# Patient Record
Sex: MALE | URBAN - METROPOLITAN AREA
[De-identification: names, ages, dates, MRNs, and addresses within clinical notes are randomized per-mention and may not be internally consistent; named-entity substitution may affect disease eponyms.]

---

## 2022-08-15 ENCOUNTER — OFFICE VISIT (OUTPATIENT)
Dept: URBAN - METROPOLITAN AREA CLINIC 76 | Facility: CLINIC | Age: 42
End: 2022-08-15
Payer: COMMERCIAL

## 2022-08-15 DIAGNOSIS — H52.13 MYOPIA, BILATERAL: Primary | ICD-10-CM

## 2022-08-15 DIAGNOSIS — H10.45 OTHER CHRONIC ALLERGIC CONJUNCTIVITIS: ICD-10-CM

## 2022-08-15 PROCEDURE — 92002 INTRM OPH EXAM NEW PATIENT: CPT | Performed by: OPTOMETRIST

## 2022-08-15 ASSESSMENT — KERATOMETRY
OD: 42.88
OS: 42.38

## 2022-08-15 ASSESSMENT — INTRAOCULAR PRESSURE
OD: 14
OS: 14

## 2022-08-15 ASSESSMENT — VISUAL ACUITY
OS: 20/20
OD: 20/20

## 2022-09-12 ENCOUNTER — TESTING ONLY (OUTPATIENT)
Dept: URBAN - METROPOLITAN AREA CLINIC 76 | Facility: CLINIC | Age: 42
End: 2022-09-12
Payer: COMMERCIAL

## 2022-09-12 DIAGNOSIS — H52.13 MYOPIA, BILATERAL: Primary | ICD-10-CM

## 2022-09-12 PROCEDURE — 92310 CONTACT LENS FITTING OU: CPT | Performed by: OPTOMETRIST

## 2022-09-12 ASSESSMENT — VISUAL ACUITY
OS: 20/20
OD: 20/20

## 2022-09-12 ASSESSMENT — KERATOMETRY
OS: 42.50
OD: 42.63

## 2022-09-12 NOTE — IMPRESSION/PLAN
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 Plan: Discussed diagnosis with patient. Order ctls trials. If pt happy with comfort and vision w/ ctls, ok to finalize. Pt to call with any concerns.